# Patient Record
Sex: FEMALE | Race: WHITE
[De-identification: names, ages, dates, MRNs, and addresses within clinical notes are randomized per-mention and may not be internally consistent; named-entity substitution may affect disease eponyms.]

---

## 2020-06-29 ENCOUNTER — HOSPITAL ENCOUNTER (OUTPATIENT)
Dept: HOSPITAL 76 - LAB | Age: 40
Discharge: HOME | End: 2020-06-29
Attending: FAMILY MEDICINE
Payer: COMMERCIAL

## 2020-06-29 DIAGNOSIS — Z20.828: ICD-10-CM

## 2020-06-29 DIAGNOSIS — R50.9: Primary | ICD-10-CM

## 2020-06-29 DIAGNOSIS — R05: ICD-10-CM

## 2020-06-29 PROCEDURE — 81599 UNLISTED MAAA: CPT

## 2020-11-11 ENCOUNTER — HOSPITAL ENCOUNTER (OUTPATIENT)
Dept: HOSPITAL 76 - LAB | Age: 40
Discharge: HOME | End: 2020-11-11
Attending: FAMILY MEDICINE
Payer: COMMERCIAL

## 2020-11-11 DIAGNOSIS — Z13.220: Primary | ICD-10-CM

## 2020-11-11 LAB
ALBUMIN DIAFP-MCNC: 4.2 G/DL (ref 3.2–5.5)
ALBUMIN/GLOB SERPL: 1.4 {RATIO} (ref 1–2.2)
ALP SERPL-CCNC: 60 IU/L (ref 42–121)
ALT SERPL W P-5'-P-CCNC: 19 IU/L (ref 10–60)
ANION GAP SERPL CALCULATED.4IONS-SCNC: 14 MMOL/L (ref 6–13)
AST SERPL W P-5'-P-CCNC: 18 IU/L (ref 10–42)
BASOPHILS NFR BLD AUTO: 0 10^3/UL (ref 0–0.1)
BASOPHILS NFR BLD AUTO: 0.5 %
BILIRUB BLD-MCNC: 0.9 MG/DL (ref 0.2–1)
BUN SERPL-MCNC: 11 MG/DL (ref 6–20)
CALCIUM UR-MCNC: 9.2 MG/DL (ref 8.5–10.3)
CHLORIDE SERPL-SCNC: 99 MMOL/L (ref 101–111)
CHOLEST SERPL-MCNC: 204 MG/DL
CO2 SERPL-SCNC: 26 MMOL/L (ref 21–32)
CREAT SERPLBLD-SCNC: 0.9 MG/DL (ref 0.4–1)
EOSINOPHIL # BLD AUTO: 0.3 10^3/UL (ref 0–0.7)
EOSINOPHIL NFR BLD AUTO: 3.2 %
ERYTHROCYTE [DISTWIDTH] IN BLOOD BY AUTOMATED COUNT: 12.6 % (ref 12–15)
GLOBULIN SER-MCNC: 2.9 G/DL (ref 2.1–4.2)
GLUCOSE SERPL-MCNC: 108 MG/DL (ref 70–100)
HDLC SERPL-MCNC: 53 MG/DL
HDLC SERPL: 3.8 {RATIO} (ref ?–4.4)
HGB UR QL STRIP: 13.9 G/DL (ref 12–16)
LDLC SERPL CALC-MCNC: 141 MG/DL
LDLC/HDLC SERPL: 2.7 {RATIO} (ref ?–4.4)
LYMPHOCYTES # SPEC AUTO: 2.9 10^3/UL (ref 1.5–3.5)
LYMPHOCYTES NFR BLD AUTO: 35.5 %
MCH RBC QN AUTO: 29.3 PG (ref 27–31)
MCHC RBC AUTO-ENTMCNC: 32.2 G/DL (ref 32–36)
MCV RBC AUTO: 90.9 FL (ref 81–99)
MONOCYTES # BLD AUTO: 0.5 10^3/UL (ref 0–1)
MONOCYTES NFR BLD AUTO: 5.5 %
NEUTROPHILS # BLD AUTO: 4.5 10^3/UL (ref 1.5–6.6)
NEUTROPHILS # SNV AUTO: 8.2 X10^3/UL (ref 4.8–10.8)
NEUTROPHILS NFR BLD AUTO: 55.2 %
PDW BLD AUTO: 9.7 FL (ref 7.9–10.8)
PLATELET # BLD: 277 10^3/UL (ref 130–450)
PROT SPEC-MCNC: 7.1 G/DL (ref 6.7–8.2)
RBC MAR: 4.75 10^6/UL (ref 4.2–5.4)
SODIUM SERPLBLD-SCNC: 139 MMOL/L (ref 135–145)
VLDLC SERPL-SCNC: 10 MG/DL

## 2020-11-11 PROCEDURE — 85025 COMPLETE CBC W/AUTO DIFF WBC: CPT

## 2020-11-11 PROCEDURE — 80061 LIPID PANEL: CPT

## 2020-11-11 PROCEDURE — 83721 ASSAY OF BLOOD LIPOPROTEIN: CPT

## 2020-11-11 PROCEDURE — 36415 COLL VENOUS BLD VENIPUNCTURE: CPT

## 2020-11-11 PROCEDURE — 80053 COMPREHEN METABOLIC PANEL: CPT

## 2020-11-11 PROCEDURE — 84443 ASSAY THYROID STIM HORMONE: CPT

## 2020-12-18 ENCOUNTER — HOSPITAL ENCOUNTER (OUTPATIENT)
Age: 40
End: 2020-12-18
Payer: OTHER GOVERNMENT

## 2020-12-18 DIAGNOSIS — Z12.31: Primary | ICD-10-CM

## 2020-12-18 DIAGNOSIS — Z80.3: ICD-10-CM

## 2020-12-18 PROCEDURE — 77067 SCR MAMMO BI INCL CAD: CPT

## 2020-12-18 PROCEDURE — 77063 BREAST TOMOSYNTHESIS BI: CPT

## 2020-12-18 NOTE — DI.MG.S_ITS
BILATERAL DIGITAL SCREENING MAMMOGRAM 3D/2D WITH CAD: 12/18/2020  
   
CLINICAL: Routine screening. Family history of breast cancer.    
   
Comparison is made to exams dated:  11/4/2014 mammogram, 7/30/2013 mammogram, and   
4/27/2010 mammogram - [facility].  There are scattered fibroglandular elements in   
both breasts.    
   
Current study was also evaluated with a Computer Aided Detection (CAD) system.    
No significant masses, calcifications, or other findings are seen in either breast.    
There has been no significant interval change.  
   
IMPRESSION: NEGATIVE  
There is no mammographic evidence of malignancy. A 1 year screening mammogram is   
recommended.    
   
   
This exam was interpreted at Station ID: [id number].    
   
NOTE: For mammograms, a report in lay terms will be sent to the patient. Approximately   
15% of breast malignancies will not be visualized mammographically. In the management of   
a palpable breast mass, a negative mammogram must not discourage biopsy of a clinically   
suspicious lesion.  
   
Electronically Signed By: Bonifacio dixon/fer:12/18/2020 15:37:34    
   
   
letter sent: Normal Exam    
ACR BI-RADS Category 1: Negative [id number]

## 2023-05-27 ENCOUNTER — HOSPITAL ENCOUNTER (OUTPATIENT)
Age: 43
End: 2023-05-27
Payer: OTHER GOVERNMENT

## 2023-05-27 DIAGNOSIS — Z12.31: Primary | ICD-10-CM

## 2023-05-27 DIAGNOSIS — Z80.3: ICD-10-CM

## 2023-05-27 PROCEDURE — 77067 SCR MAMMO BI INCL CAD: CPT

## 2023-05-27 PROCEDURE — 77063 BREAST TOMOSYNTHESIS BI: CPT

## 2023-08-03 ENCOUNTER — HOSPITAL ENCOUNTER (OUTPATIENT)
Age: 43
End: 2023-08-03
Payer: OTHER GOVERNMENT

## 2023-08-03 DIAGNOSIS — Z13.220: Primary | ICD-10-CM

## 2023-08-03 LAB
ADD MANUAL DIFF / SLIDE REVIEW: NO
ALBUMIN SERPL-MCNC: 4.3 G/DL (ref 3.5–5)
ALBUMIN/GLOB SERPL: 1.4 {RATIO} (ref 1–2.8)
ALP SERPL-CCNC: 57 U/L (ref 38–126)
ALT SERPL-CCNC: 23 IU/L (ref ?–35)
BUN SERPL-MCNC: 12 MG/DL (ref 7–17)
CALCIUM SERPL-MCNC: 9.6 MG/DL (ref 8.4–10.2)
CEA SERPL-MCNC: 0.7 NG/ML (ref 0.1–3)
CHLORIDE SERPL-SCNC: 101 MMOL/L (ref 98–107)
CO2 SERPL-SCNC: 28 MMOL/L (ref 22–32)
ESTIMATED GLOMERULAR FILT RATE: > 60 ML/MIN (ref 60–?)
GLOBULIN SER CALC-MCNC: 3 G/DL (ref 1.7–4.1)
GLUCOSE SERPL-MCNC: 96 MG/DL (ref 70–100)
HEMATOCRIT: 40.8 % (ref 36–46)
HEMOGLOBIN: 13.9 G/DL (ref 12–16)
HEMOLYSIS: < 15 (ref 0–50)
LYMPHOCYTES # SPEC AUTO: 2100 /UL (ref 1100–4500)
MCV RBC: 87 FL (ref 80–100)
MEAN CORPUSCULAR HEMOGLOBIN: 29.7 PG (ref 26–34)
MEAN CORPUSCULAR HGB CONC: 34.2 % (ref 30–36)
PLATELET COUNT: 269 X10^3/UL (ref 150–400)
POTASSIUM SERPL-SCNC: 4.1 MMOL/L (ref 3.4–5.1)
PROT SERPL-MCNC: 7.3 G/DL (ref 6.3–8.2)
SODIUM SERPL-SCNC: 137 MMOL/L (ref 137–145)
TSH W/ REFLEX TO FT4: 0.87 UIU/ML (ref 0.47–4.68)

## 2023-08-03 PROCEDURE — 84443 ASSAY THYROID STIM HORMONE: CPT

## 2023-08-03 PROCEDURE — 80053 COMPREHEN METABOLIC PANEL: CPT

## 2023-08-03 PROCEDURE — 82378 CARCINOEMBRYONIC ANTIGEN: CPT

## 2023-08-03 PROCEDURE — 85025 COMPLETE CBC W/AUTO DIFF WBC: CPT

## 2023-08-03 PROCEDURE — 36415 COLL VENOUS BLD VENIPUNCTURE: CPT

## 2024-12-27 ENCOUNTER — HOSPITAL ENCOUNTER (OUTPATIENT)
Age: 44
End: 2024-12-27
Payer: OTHER GOVERNMENT

## 2024-12-27 DIAGNOSIS — Z80.3: ICD-10-CM

## 2024-12-27 DIAGNOSIS — Z12.31: Primary | ICD-10-CM

## 2024-12-27 PROCEDURE — 77067 SCR MAMMO BI INCL CAD: CPT

## 2024-12-27 PROCEDURE — 77063 BREAST TOMOSYNTHESIS BI: CPT

## 2024-12-27 NOTE — DI.MG.S_ITS
BILATERAL DIGITAL SCREENING MAMMOGRAM 3D/2D WITH CAD: 12/27/2024 
  
CLINICAL: Routine screening. Family history of breast cancer.   
  
Comparison is made to exams dated:  5/27/2023 mammogram, 12/18/2020 mammogram, and  
11/4/2014 mammogram - [facility].   
  
There are scattered areas of fibroglandular density (category b / 25%-50% glandular  
tissue).   
  
Current study was also evaluated with a Computer Aided Detection (CAD) system.   
No significant masses, calcifications, or other findings are seen in either breast.   
There has been no significant interval change. 
  
IMPRESSION: NEGATIVE 
There is no mammographic evidence of malignancy. A 1 year screening mammogram is  
recommended.   
  
Based on the Tyrer Cuzick model (a risk assessment model) the patient's lifetime risk is  
17.4% and her 10 year risk is 2.9%. According to the ACR, ACS, and NCCN guidelines, an  
annual breast MRI exam along with mammogram is recommended if the patient's lifetime risk  
is 20% or greater. 
  
  
This exam was interpreted at Station ID: [id number].   
  
NOTE: For mammograms, a report in lay terms will be sent to the patient. Approximately  
15% of breast malignancies will not be visualized mammographically. In the management of  
a palpable breast mass, a negative mammogram must not discourage biopsy of a clinically  
suspicious lesion. 
  
Electronically Signed By: Asiya Franz M.D., Ph.D.           
corrina/fer:12/30/2024 04:53:11   
  
  
letter sent: Normal Exam   
ACR BI-RADS Category 1: Negative

## 2025-02-27 ENCOUNTER — HOSPITAL ENCOUNTER (OUTPATIENT)
Age: 45
Discharge: HOME | End: 2025-02-27
Payer: OTHER GOVERNMENT

## 2025-02-27 VITALS
OXYGEN SATURATION: 12 % | RESPIRATION RATE: 78 BRPM | SYSTOLIC BLOOD PRESSURE: 104 MMHG | TEMPERATURE: 97.88 F | DIASTOLIC BLOOD PRESSURE: 61 MMHG

## 2025-02-27 VITALS
DIASTOLIC BLOOD PRESSURE: 35 MMHG | HEART RATE: 74 BPM | OXYGEN SATURATION: 99 % | SYSTOLIC BLOOD PRESSURE: 90 MMHG | RESPIRATION RATE: 21 BRPM

## 2025-02-27 VITALS
TEMPERATURE: 97.8 F | SYSTOLIC BLOOD PRESSURE: 122 MMHG | HEART RATE: 90 BPM | RESPIRATION RATE: 16 BRPM | DIASTOLIC BLOOD PRESSURE: 76 MMHG | OXYGEN SATURATION: 95 %

## 2025-02-27 VITALS
SYSTOLIC BLOOD PRESSURE: 106 MMHG | DIASTOLIC BLOOD PRESSURE: 65 MMHG | RESPIRATION RATE: 25 BRPM | TEMPERATURE: 98.2 F | HEART RATE: 82 BPM | OXYGEN SATURATION: 98 %

## 2025-02-27 VITALS
OXYGEN SATURATION: 100 % | DIASTOLIC BLOOD PRESSURE: 58 MMHG | HEART RATE: 71 BPM | SYSTOLIC BLOOD PRESSURE: 115 MMHG | RESPIRATION RATE: 14 BRPM

## 2025-02-27 DIAGNOSIS — R13.10: Primary | ICD-10-CM

## 2025-02-27 PROCEDURE — 43239 EGD BIOPSY SINGLE/MULTIPLE: CPT

## 2025-02-27 PROCEDURE — 0DJD8ZZ INSPECTION OF LOWER INTESTINAL TRACT, VIA NATURAL OR ARTIFICIAL OPENING ENDOSCOPIC: ICD-10-PCS | Performed by: SURGERY

## 2025-02-27 PROCEDURE — 0DJ08ZZ INSPECTION OF UPPER INTESTINAL TRACT, VIA NATURAL OR ARTIFICIAL OPENING ENDOSCOPIC: ICD-10-PCS | Performed by: SURGERY

## 2025-02-27 PROCEDURE — 45378 DIAGNOSTIC COLONOSCOPY: CPT

## 2025-02-27 NOTE — PM.OP.EC
Operative Date/Time/Diagnoses
Date of procedure: 02/27/25
Time of procedure: 14:02
Pre-op diagnosis: Dysphagia
Post-op diagnosis: same

Procedure & Clinicians
Study performed: 
Esophagogastroduodenoscopy and colonoscopy
Same procedure as scheduled: Yes
Surgeon: Rafita Nina
Procedure Notes
Procedure in detail: 
Surgeon: Rafita Nina MD

Anesthesia:  Tyra Chappell DO

Procedure in detail: A timeout was performed.  A bite blocked was placed and monitors were attached to the patient.  The patient was positioned in the left lateral decubitus position.  Sedation was administered.  Once the patient was sedated the 
endoscope was inserted through the bite block and passed through the esophagus and stomach and into the duodenum.  The duodenum appeared normal.  We then withdrew the scope into the stomach.    There were polyps in the proximal stomach, likely 
fundic gland polyps.  Two of the polyps were biopsied with forceps. The endoscope was retroflexed and no hiatal hernia was seen.  The endoscope was straightned and withdrawn into the esophagus.  There was a 2 mm polyp near the GE junction which 
could have been a fundic gland polyp that was slightly prolapsed into the distal esophagus.  It was biopsied with forceps and sent as ?GE junction polyp?.  There was no maryann esophagitis.  Biopsies were taken with forceps from the distal and mid 
esophagus to rule out EOE.

EGD findings:  Multiple gastric polyps and a polyp near the GE junction

Next we repositioned the patient for a colonoscopy.  A digital rectal exam was performed and was normal.  The colonoscope was inserted and advanced to the cecum.  The appendiceal orifice was identified and photographed.  The scope was slowly 
withdrawn over greater than 6 minutes.  No abnormalities were found.  The scope was retroflexed in the rectum and no abnormalities were seen.

Colonoscopy findings:  Normal colon

Total procedural EBL:  3 mL

Scope withdrawal time:  7 minutes
Sedation minutes:  21 minutes

Post-procedure
Disposition: PACU

## 2025-02-27 NOTE — P.HP_ITS
History of Present Illness    
History of Present Illness    
Date Patient Seen: 02/27/25    
Time Patient Seen: 13:25    
Chief complaint: EGD    
Narrative:     
44-year-old woman with history of dysphagia.    
    
[facility]    
Surgical History (Reviewed 01/22/25 @ 16:00 by Joselin Acosta MA)    
    
Status post colonoscopy (07/07/15)    
    
    
Social History (Reviewed 01/22/25 @ 16:00 by Joselin Acosta MA)    
marital status:       
number of children:  3     
household members:  spouse and children     
lives independently:  Yes     
occupational status:  employed     
Smoking Status:  Never smoker     
alcohol intake:  current     
substance use type:  does not use     
    
    
    
Meds    
Home Medications and Allergies    
                                Home Medications    
    
    
    
 Medication  Instructions  Recorded  Confirmed  Type    
     
levonorgestrel 21 mcg/24 hr (up to intrauterine 08/03/23 01/28/25 History    
    
8 years) 52 mg intrauterine device        
    
(Mirena)        
     
peg 3350-electrolytes 236 240 ml PO Q10M #4,000 mL 02/18/25  Rx    
    
gram-22.74 gram-6.74 gram-5.86        
    
gram solution (Golytely)        
    
    
    
                                    Allergies    
    
    
    
Allergy/AdvReac Type Severity Reaction Status Date / Time    
     
Sulfa (Sulfonamide Allergy Mild CHILDHOOD Verified 01/28/25 15:32    
    
Antibiotics)   ALLERGY,      
    
   UNKNOWN      
    
    
    
    
Exam    
Vital Signs    
(past 8 hours):     
                                        -    
    
    
    
 02/27/25    
13:13    
     
Temperature 97.8 F    
     
Pulse Rate 90    
     
Respiratory Rate 16    
     
Blood Pressure 122/76    
     
Pulse Oximetry 95    
     
Oxygen Delivery Method Room Air    
    
    
                                            
    
    
    
Oxygen Delivery Method         Room Air                                           
    
    
    
    
    
Const    
General: healthy appearing    
    
Assessment & Plan    
Assessment and plan    
(1) Dysphagia:     
      Qualifiers:    
        Dysphagia type: esophageal phase  Qualified Code(s): R13.19 - Other   
dysphagia    
      Status: Acute    
    
Plan    
EGD and colonoscopy    
Time-Based Coding    
::     
[TOTAL MINUTES] spent with patient and on the chart (including review of chart,   
obtaining history, exam, reviewing outside data, placing orders, documenting   
exam and treatment plan, and counseling patient) on [DATE].    
    
    
 PROFEE    
Charge Entry    
Document charge(s): No

## 2025-02-27 NOTE — PM.HP.IH.1
History of Present Illness
History of Present Illness
Date Patient Seen: 02/27/25
Time Patient Seen: 13:25
Chief complaint: EGD
Narrative: 
44-year-old woman with history of dysphagia.

[facility]
Surgical History (Reviewed 01/22/25 @ 16:00 by Joselin Acosta MA)

Status post colonoscopy (07/07/15)


Social History (Reviewed 01/22/25 @ 16:00 by Joselin Acosta MA)
marital status:   
number of children:  3 
household members:  spouse and children 
lives independently:  Yes 
occupational status:  employed 
Smoking Status:  Never smoker 
alcohol intake:  current 
substance use type:  does not use 



Meds
Home Medications and Allergies
Home Medications

 Medication  Instructions  Recorded  Confirmed  Type
levonorgestrel 21 mcg/24 hr (up to intrauterine 08/03/23 01/28/25 History
8 years) 52 mg intrauterine device    
(Mirena)    
peg 3350-electrolytes 236 240 ml PO Q10M #4,000 mL 02/18/25  Rx
gram-22.74 gram-6.74 gram-5.86    
gram solution (Golytely)    


Allergies

Allergy/AdvReac Type Severity Reaction Status Date / Time
Sulfa (Sulfonamide Allergy Mild CHILDHOOD Verified 01/28/25 15:32
Antibiotics)   ALLERGY,  
   UNKNOWN  



Exam
Vital Signs
(past 8 hours): 
-

 02/27/25
13:13
Temperature 97.8 F
Pulse Rate 90
Respiratory Rate 16
Blood Pressure 122/76
Pulse Oximetry 95
Oxygen Delivery Method Room Air



Oxygen Delivery Method         Room Air                                          



Const
General: healthy appearing

Assessment & Plan
Assessment and plan
(1) Dysphagia: 
      Qualifiers:
        Dysphagia type: esophageal phase  Qualified Code(s): R13.19 - Other dysphagia
      Status: Acute

Plan
EGD and colonoscopy
Time-Based Coding
:: 
[TOTAL MINUTES] spent with patient and on the chart (including review of chart, obtaining history, exam, reviewing outside data, placing orders, documenting exam and treatment plan, and counseling patient) on [DATE].


 PROFEE
Charge Entry
Document charge(s): No

## 2025-02-27 NOTE — PATH_ITS
[facility] Accession Number: [id number] 
No. of containers..04 Tissue 
.                                                                01 
Material submitted:                                        . 
PART A: gastrointestinal site - GASTRIC POLYPS 
PART B: esophagus - ESOPHAGUS POLYP 
PART C: esophagus - DISTAL ESOPHAGUS 
PART D: esophagus - MID ESOPHAGUS 
.                                                                01 
********************************************************************** 
Diagnosis: 
Part A: GASTRIC POLYPS: 
Fundic gland polyps, with mild chronic inflammation. 
No Helicobacter organisms identified. 
No intestinal metaplasia, dysplasia, or malignancy identified. 
. 
Part B: ESOPHAGUS POLYP: 
1. Squamous and squamocolumnar junctional mucosa with changes consistent 
with reflux. 
2. Areas of polypoid squamous mucosa suggestive of benign squamous 
papilloma. 
3. No goblet cell metaplasia, dysplasia, or malignancy identified. 
. 
Part C: DISTAL ESOPHAGUS: 
Squamocolumnar junctional mucosa with changes consistent with reflux. 
No goblet cell metaplasia or dysplasia identified. 
. 
Part D: MID ESOPHAGUS: 
Squamous mucosa with changes consistent with reflux. 
No dysplasia or infectious organisms identified. 
. 
Specimen Comments: 
Eosinophils are present in numbers of up to 10 per high power microscopic 
field. 
STO  03/03/2025  1511 Local 
********************************************************************** 
.                                                                01 
Electronically signed:                                     . 
Daniel L Toweill, MD, Pathologist 
NPI- [id number] 
.                                                                01 
Gross description:                                         . 
Part A: GASTRIC POLYPS: 
Received in formalin are 2 fragment(s) of tan, soft tissue measuring 
0.3 x 0.3 x 0.3 cm to 0.4 x 0.3 x 0.3 cm submitted entirely in 1 
cassette(s) 
. 
Part B: ESOPHAGUS POLYP: 
Received in formalin are multiple fragment(s) of tan, soft tissue 
measuring 0.1 x 0.1 x 0.1 cm to 0.3 x 0.3 x 0.2 cm submitted entirely 
in 1 cassette(s) 
. 
Part C: DISTAL ESOPHAGUS: 
Received in formalin are 2 fragment(s) of tan, soft tissue measuring 
0.2 x 0.2 x 0.1 cm to 0.4 x 0.2 x 0.2 cm submitted entirely in 1 
cassette(s) 
. 
Part D: MID ESOPHAGUS: 
Received in formalin is 1 fragment(s) of tan, soft tissue measuring 
0.4 x 0.2 x 0.1 cm submitted entirely in 1 cassette(s) 
/DAN  03/03/2025  1511 Local 
.                                                                01 
Microscopic:                                                    . 
Part A: GASTRIC POLYPS: 
An immunohistochemical stain was performed to evaluate for Helicobacter 
organisms and is negative. The control stains appropriately. 
* This test was developed and the performance characteristics were 
validated by [employer]. It has not been cleared or approved by the Food and 
Drug Administration. 
. 
Part B: ESOPHAGUS POLYP: 
An ABPAS stain was performed to evaluate for goblet cell metaplasia and is 
negative. The control stains appropriately. 
. 
Part C: DISTAL ESOPHAGUS: 
An ABPAS stain was performed to evaluate for goblet cell metaplasia and is 
negative. The control stains appropriately. 
. 
Part D: MID ESOPHAGUS: 
An ABPAS stain was performed to evaluate for fungal organisms and is 
negative. The control stains appropriately. 
.                                                                01 
Pathologist provided ICD-10: 
D13.0, K21.0, K31.7 
.                                                                01 
CPT                                                        . 
516572, 525756, 530761, 225682, 799495, 832356, 037001, [id number] 
Specimen Comment: A courtesy copy of this report has been sent to [phone] 
***Performed at:  [address] 
   [facility] [address], [place], [place]  [id number] 
   MD Daniel Toweill MD Phone:  [phone]
